# Patient Record
Sex: MALE | Race: WHITE | NOT HISPANIC OR LATINO | URBAN - METROPOLITAN AREA
[De-identification: names, ages, dates, MRNs, and addresses within clinical notes are randomized per-mention and may not be internally consistent; named-entity substitution may affect disease eponyms.]

---

## 2022-01-27 ENCOUNTER — EMERGENCY (EMERGENCY)
Facility: HOSPITAL | Age: 27
LOS: 1 days | Discharge: HOME | End: 2022-01-27
Admitting: EMERGENCY MEDICINE
Payer: MEDICAID

## 2022-01-27 VITALS
RESPIRATION RATE: 18 BRPM | TEMPERATURE: 98 F | OXYGEN SATURATION: 100 % | HEART RATE: 67 BPM | DIASTOLIC BLOOD PRESSURE: 47 MMHG | HEIGHT: 66 IN | SYSTOLIC BLOOD PRESSURE: 126 MMHG | WEIGHT: 164.91 LBS

## 2022-01-27 DIAGNOSIS — M25.521 PAIN IN RIGHT ELBOW: ICD-10-CM

## 2022-01-27 DIAGNOSIS — Z88.0 ALLERGY STATUS TO PENICILLIN: ICD-10-CM

## 2022-01-27 PROCEDURE — 73200 CT UPPER EXTREMITY W/O DYE: CPT | Mod: 26,RT,MG

## 2022-01-27 PROCEDURE — G1004: CPT

## 2022-01-27 PROCEDURE — 73080 X-RAY EXAM OF ELBOW: CPT | Mod: 26,RT

## 2022-01-27 PROCEDURE — 73080 X-RAY EXAM OF ELBOW: CPT

## 2022-01-27 PROCEDURE — 73200 CT UPPER EXTREMITY W/O DYE: CPT | Mod: MG

## 2022-01-27 PROCEDURE — 99284 EMERGENCY DEPT VISIT MOD MDM: CPT

## 2022-01-27 PROCEDURE — 99284 EMERGENCY DEPT VISIT MOD MDM: CPT | Mod: 25

## 2022-01-27 RX ORDER — IBUPROFEN 200 MG
600 TABLET ORAL ONCE
Refills: 0 | Status: COMPLETED | OUTPATIENT
Start: 2022-01-27 | End: 2022-01-27

## 2022-01-27 RX ADMIN — Medication 600 MILLIGRAM(S): at 19:17

## 2022-01-27 NOTE — ED PROVIDER NOTE - MUSCULOSKELETAL, MLM
Spine and all extremities grossly appears normal, range of motion is not limited,  right elbow - tender, no deformity, no bursitis, NROM, good distal pulses.

## 2022-01-27 NOTE — ED ADULT NURSE REASSESSMENT NOTE - NS ED NURSE REASSESS COMMENT FT1
pt requesting food. at this time pt pending imaging results. pt educated to hold on eating at this time. pt verbalizes understanding. pt not in any acute distress

## 2022-01-27 NOTE — ED PROVIDER NOTE - PATIENT PORTAL LINK FT
You can access the FollowMyHealth Patient Portal offered by [facility] by registering at the following website: [email or web address]. By joining [employer]’s FollowMyHealth portal, you will also be able to view your health information using other applications (apps) compatible with our system.

## 2022-01-27 NOTE — ED PROVIDER NOTE - CARE PROVIDER_API CALL
Dilip Alcala)  [facility]  Orthopedics  [address] [place] [address]  Phone: [phone]  Fax: [phone]  Follow Up Time:     Milo Avalos)  Orthopaedic Surgery  [address] [address]  Phone: [phone]  Fax: [phone]  Follow Up Time:

## 2022-01-27 NOTE — ED PROVIDER NOTE - CARE PROVIDERS DIRECT ADDRESSES
[email or web address]

## 2022-01-27 NOTE — ED PROVIDER NOTE - OBJECTIVE STATEMENT
27 yo male in the ER with pain to his right elbow. Pt reports he is a martial arts > right elbow  started few month ago. Pt reports he felt like his joint has been "locking" some times. Pt can not recall direct injury to his right arm or elbow. Recently elbow became much more painful and pain is not resolving. Pt reports he can not fully extend his right arm. denies any discolorations, swelling , numbness or tingling to right UE.

## 2022-01-27 NOTE — ED PROVIDER NOTE - NSFOLLOWUPINSTRUCTIONS_ED_ALL_ED_FT
ELBOW SPRAIN - [facility](R) Instructions(ER/ED)           Elbow Sprain    WHAT YOU NEED TO KNOW:    An elbow sprain is caused by a stretched or torn ligament in the elbow joint. Ligaments are the strong tissues that connect bones.    DISCHARGE INSTRUCTIONS:    Return to the emergency department if:   •The skin of your injured arm looks bluish or pale (less color than normal).      •You have new or increased numbness in your injured arm.      Call your doctor if:   •You have increased swelling and pain in your elbow.      •You have new or increased stiffness or trouble moving your injured arm.      •You have questions or concerns about your condition or care.      Medicines:   •Prescription pain medicine may be given. Ask your healthcare provider how to take this medicine safely. Some prescription pain medicines contain acetaminophen. Do not take other medicines that contain acetaminophen without talking to your healthcare provider. Too much acetaminophen may cause liver damage. Prescription pain medicine may cause constipation. Ask your healthcare provider how to prevent or treat constipation.       •Take your medicine as directed. Contact your healthcare provider if you think your medicine is not helping or if you have side effects. Tell him or her if you are allergic to any medicine. Keep a list of the medicines, vitamins, and herbs you take. Include the amounts, and when and why you take them. Bring the list or the pill bottles to follow-up visits. Carry your medicine list with you in case of an emergency.      Rest your elbow: You will need to rest your elbow for 1 to 2 days after your injury. This will help decrease the risk of more damage to your elbow.    Ice your elbow: Apply ice on your elbow for 15 to 20 minutes every hour or as directed. Use an ice pack, or put crushed ice in a plastic bag. Cover it with a towel. Ice helps prevent tissue damage and decreases swelling and pain.    Compress your elbow: Compression provides support and helps decrease swelling and movement so your elbow can heal. You may be told to keep your elbow wrapped with a tight elastic bandage. Follow instructions about how to apply your bandage.    Elevate your elbow: Elevate your elbow above the level of your heart as often as you can. This will help decrease swelling and pain. Prop your elbow on pillows or blankets to keep it elevated comfortably.     Exercise your elbow: You should begin to exercise your arm in a few days, once you are able to move your elbow without pain. Exercises will help decrease stiffness and improve the strength of your arm. Ask your healthcare provider what kind of exercises you should do.    Prevent another elbow sprain:   •Make sure you warm up and stretch before you exercise.      •Do not exercise when you feel pain or you are tired.      •Wear equipment to protect yourself when you play sports.      •Stop exercising and playing sports if your symptoms from a past injury return.      Follow up with your doctor within 1 week: Write down any questions you have so you remember to ask them in your follow-up visits.

## 2022-01-27 NOTE — ED PROVIDER NOTE - CLINICAL SUMMARY MEDICAL DECISION MAKING FREE TEXT BOX
27 yo male in the ER with pain to his right elbow. Pt reports he is a martial arts > right elbow  started few month ago. Pt reports he felt like his joint has been "locking" some times. Pt can not recall direct injury to his right arm or elbow. Recently elbow became much more painful and pain is not resolving. Pt reports he can not fully extend his right arm. denies any discolorations, swelling , numbness or tingling to right UE.   on exam- no deformity, no bursitis, diffuse tenderness+. no clinical findings to suspect infection in the joint. xray done- not clear if possible fx. discussed with radiology on call and CT scan recommended. Ct done. no evidence of fx.   will d/c home for out pt f/u with ortho.

## 2022-01-28 ENCOUNTER — TRANSCRIPTION ENCOUNTER (OUTPATIENT)
Age: 27
End: 2022-01-28

## 2022-01-31 ENCOUNTER — APPOINTMENT (OUTPATIENT)
Dept: ORTHOPEDIC SURGERY | Facility: CLINIC | Age: 27
End: 2022-01-31
Payer: MEDICAID

## 2022-01-31 PROBLEM — Z00.00 ENCOUNTER FOR PREVENTIVE HEALTH EXAMINATION: Status: ACTIVE | Noted: 2022-01-31

## 2022-01-31 PROCEDURE — 99204 OFFICE O/P NEW MOD 45 MIN: CPT

## 2022-01-31 NOTE — ASSESSMENT
[FreeTextEntry1] : Discussed with patient at length symptoms and diagnosis.  Lengthy discussion with patient regarding nonoperative and operative risks and benefits as well as surgical expectations and postop protocols and expectations, including the possibility that surgery may fail to satisfactorily resolve patient's condition / symptoms.  Risks and benefits detailed at length with risks including but not limited to persistent elbow stiffness, infection, neurovascular injury possible future recurrence of loose body and need for repeat surgery.  Patient expresses understanding and patient's questions were answered.  Patient elects to proceed with surgery.

## 2022-01-31 NOTE — HISTORY OF PRESENT ILLNESS
[de-identified] : Location: Right elbow\par Duration: 4 months \par Context: felt a pull in the elbow while doing jiujiAmarinu\par Quality: sharp\par Aggravating factors: elbow flexion/extension\par Associated symptoms: clicking\par Conservative treatment: rest, Meloxicam\par Prior studies: X-ray and CT 1/27/22 [facility]

## 2022-01-31 NOTE — PHYSICAL EXAM
[de-identified] : Right elbow\par Constitutional: \par The patient is healthy-appearing and in no apparent distress. \par \par Cardiovascular System: \par There is capillary refill less than 2 seconds. \par \par Skin: \par There is no skin abnormalities of elbow.\par \par Right Elbow: \par Appearance: \par There is no deformity, induration, redness, swelling, or warmth and a normal carrying angle. \par \par Bony Palpation: \par There is no tenderness of the medial epicondyle.\par There is no tenderness of olecranon.\par There is no tenderness of the ulnatrochlea articulation.\par There is no tenderness of the coronoid process.\par There is no tenderness of the radial head.\par There is no tenderness of the radiocapitellar joint.\par There is no tenderness of the lateral epicondyle. \par \par Soft Tissue Palpation: \par There is no tenderness of the ulnar nerve.\par There is no tenderness of the biceps insertion.\par There is no tenderness of the pronator teres.\par There is no tenderness of the flexor carpi ulnaris.\par There is no tenderness of the flexor carpi radialis.\par There is no tenderness of the annular ligament of the radius.\par There is no tenderness of the brachioradialis.\par There is no tenderness of the radial collateral ligament.\par There is no tenderness of the ulnar collateral ligament.\par There is no tenderness of the antecubital fossa.\par There is no tenderness of the extensor carpi radialis brevis.\par There is no tenderness of the extensor carpi radialis longus.\par \par Range of Motion:  \par There is decreased range of motion both actively and passively with extension to 20 degrees and flexion to 135 with pain at end range.  Full pronation and supination.\par \par Stability:\par There is no dislocation or laxity to testing.\par  \par Strength: \par There is 5/5 elbow flexion, extension, supination and pronation.  \par \par Neurologic:\par There is normal sensation C5-T1 to light touch. \par \par Psychiatric: \par The patient demonstrates a normal mood and affect and is active and alert. [de-identified] : CT scan of right elbow.  There are multiple loose bodies within the elbow particularly a large one in the olecranon fossa as well as cornoid fossa.  \par

## 2022-02-01 VITALS — WEIGHT: 170 LBS | HEIGHT: 66 IN | BODY MASS INDEX: 27.32 KG/M2

## 2022-02-16 ENCOUNTER — LABORATORY RESULT (OUTPATIENT)
Age: 27
End: 2022-02-16

## 2022-02-17 RX ORDER — OXYCODONE AND ACETAMINOPHEN 5; 325 MG/1; MG/1
5-325 TABLET ORAL
Qty: 30 | Refills: 0 | Status: ACTIVE | COMMUNITY
Start: 2022-02-17 | End: 1900-01-01

## 2022-02-18 ENCOUNTER — APPOINTMENT (OUTPATIENT)
Dept: ORTHOPEDIC SURGERY | Facility: CLINIC | Age: 27
End: 2022-02-18
Payer: MEDICAID

## 2022-02-18 PROCEDURE — 29834 ELBOW ARTHROSCOPY/SURGERY: CPT | Mod: RT

## 2022-02-18 PROCEDURE — 24300 MNPJ ELBOW UNDER ANES: CPT | Mod: RT,59

## 2022-02-18 PROCEDURE — 29837 ELBOW ARTHROSCOPY/SURGERY: CPT | Mod: RT,59

## 2022-02-18 PROCEDURE — 29835 ELBOW ARTHROSCOPY/SURGERY: CPT | Mod: RT,59

## 2022-02-22 ENCOUNTER — APPOINTMENT (OUTPATIENT)
Dept: ORTHOPEDIC SURGERY | Facility: CLINIC | Age: 27
End: 2022-02-22
Payer: MEDICAID

## 2022-02-22 PROCEDURE — 99024 POSTOP FOLLOW-UP VISIT: CPT

## 2022-02-22 NOTE — HISTORY OF PRESENT ILLNESS
[de-identified] : s/p RIGHT elbow arthroscopy with loose bodies removal, lysis of adhesions/capsulectomy, exostectomy, ENRIQUE [de-identified] : The patient is presenting for first postop evaluation.  He states overall he doing well not taking any current pain medication.  He denies any paresthesias.  He states he maintained a postop splint.  [de-identified] : s/p RIGHT elbow arthroscopy with loose bodies removal, lysis of adhesions/capsulectomy, exostectomy, ENRIQUE [de-identified] : On exam of the right elbow, the anterior posterior splint is intact and remove our the patient was noted to also be flexing the elbow to approximately 10° even despite this splint.  There is mild swelling at the elbow consistent with expectation.  The wounds were healed the sutures which were removed and Steri-Stripped.  Normal sensation light touch C5-T1 with 5 out of 5 wrist flexion and extension  and intrinsics.  Range of motion at the elbow actively his extension to 15° and flexion to 100 with passive range of motion flexion to 115 limited secondary to pain and extension to 10°.  There is full pronation supination [de-identified] : Lengthy discussion with the patient reversed intraoperative findings and on the table with elbow flexion to maximum of 145-150 and extension to 0°.  Discussed at length again with patient that this current decrease range of motion his muscle memory in regards to his stiffness and not protracted and prolonged physical therapy is needed to clinic several weeks to increase he gains made intraoperatively.  Patient begin PT and he scheduled tomorrow and is to follow up in 2 weeks for range of motion check

## 2022-03-16 ENCOUNTER — APPOINTMENT (OUTPATIENT)
Dept: ORTHOPEDIC SURGERY | Facility: CLINIC | Age: 27
End: 2022-03-16
Payer: MEDICAID

## 2022-03-16 DIAGNOSIS — M25.629 STIFFNESS OF UNSPECIFIED ELBOW, NOT ELSEWHERE CLASSIFIED: ICD-10-CM

## 2022-03-16 DIAGNOSIS — M24.029: ICD-10-CM

## 2022-03-16 PROCEDURE — 99024 POSTOP FOLLOW-UP VISIT: CPT

## 2022-03-17 PROBLEM — M25.629 ELBOW STIFFNESS: Status: ACTIVE | Noted: 2022-03-17

## 2022-03-17 PROBLEM — M24.029 LOOSE BODY OF ELBOW: Status: ACTIVE | Noted: 2022-01-31

## 2022-03-17 NOTE — HISTORY OF PRESENT ILLNESS
[de-identified] : s/p RIGHT elbow arthroscopy with loose bodies removal, lysis of adhesions/capsulectomy, exostectomy, ENRIQUE [de-identified] : The patient is presenting for follow-up postop evaluation.  He states overall he doing well and states PT going well.  He denies any paresthesias.  He states markedly improved flexion and moderately improved extension [de-identified] : On exam of the right elbow, the  wounds are healed.  AROM and PROM ext 10 degrees and flexion 145,  Normal sensation light touch C5-T1 with 5 out of 5 wrist flexion and extension  and intrinsics.  There is full pronation and supination [de-identified] : s/p RIGHT elbow arthroscopy with loose bodies removal, lysis of adhesions/capsulectomy, exostectomy, ENRIQUE [de-identified] : Discussed with patient overall progress and to continue PT and home exercises with focus on extension.  Follow-up in 3-4 weeks and if no extension improvement, consideration to dynasplint

## 2024-07-30 NOTE — ED ADULT NURSE NOTE - EXTENSIONS OF SELF_ADULT
Medication Question or Refill    Contacts       Contact Date/Time Type Contact Phone/Fax    07/30/2024 11:37 AM CDT Phone (Incoming) [id number] - [place], [place] - [address] (Pharmacy) [phone]            What medication are you calling about (include dose and sig)?:     FLUoxetine (PROZAC) 10 MG tablet       Preferred Pharmacy:   [id number] - [place], [place] - [address]  [address] [facility] [address]  Phone: [phone] Fax: [phone]      Controlled Substance Agreement on file:   CSA -- Patient Level:    CSA: None found at the patient level.       Who prescribed the medication?: Edie Esqueda     Do you need a refill? Yes    When did you use the medication last? 07/30/2024    Patient offered an appointment? No    Do you have any questions or concerns?  Yes: Pharmacy called and stated that the insurance will no longer cover this medication as tablets they are asking for them to send a new script for capsule only. Please advise and call pharmacy with updates please and thank you.      Could we send this information to you in [place] or would you prefer to receive a phone call?:   Patient would prefer a phone call   Okay to leave a detailed message?: Yes at Other phone number:  [phone]    
None